# Patient Record
Sex: FEMALE
[De-identification: names, ages, dates, MRNs, and addresses within clinical notes are randomized per-mention and may not be internally consistent; named-entity substitution may affect disease eponyms.]

---

## 2018-12-05 ENCOUNTER — HOSPITAL ENCOUNTER (OUTPATIENT)
Dept: HOSPITAL 5 - GIO | Age: 43
Discharge: HOME | End: 2018-12-05
Attending: INTERNAL MEDICINE
Payer: COMMERCIAL

## 2018-12-05 VITALS — DIASTOLIC BLOOD PRESSURE: 70 MMHG | SYSTOLIC BLOOD PRESSURE: 118 MMHG

## 2018-12-05 DIAGNOSIS — K57.30: ICD-10-CM

## 2018-12-05 DIAGNOSIS — Z85.3: ICD-10-CM

## 2018-12-05 DIAGNOSIS — Z79.899: ICD-10-CM

## 2018-12-05 DIAGNOSIS — Z90.710: ICD-10-CM

## 2018-12-05 DIAGNOSIS — Z90.49: ICD-10-CM

## 2018-12-05 DIAGNOSIS — K63.5: Primary | ICD-10-CM

## 2018-12-05 DIAGNOSIS — K63.89: ICD-10-CM

## 2018-12-05 DIAGNOSIS — K64.8: ICD-10-CM

## 2018-12-05 DIAGNOSIS — Z86.73: ICD-10-CM

## 2018-12-05 DIAGNOSIS — Z98.890: ICD-10-CM

## 2018-12-05 DIAGNOSIS — F17.210: ICD-10-CM

## 2018-12-05 DIAGNOSIS — Z88.0: ICD-10-CM

## 2018-12-05 DIAGNOSIS — Z88.8: ICD-10-CM

## 2018-12-05 DIAGNOSIS — E78.00: ICD-10-CM

## 2018-12-05 DIAGNOSIS — K92.1: ICD-10-CM

## 2018-12-05 PROCEDURE — 45380 COLONOSCOPY AND BIOPSY: CPT

## 2018-12-05 PROCEDURE — 88305 TISSUE EXAM BY PATHOLOGIST: CPT

## 2018-12-05 NOTE — HISTORY AND PHYSICAL REPORT
HISTORY OF PRESENT ILLNESS:  The patient is a 43-year-old white female who has a

prior history of diverticular disease and gastric ulcer, which appears to have

healed, but lately she has been having some lower GI bleeding for which she has

been sent for further evaluation.  She also has been noted to have prior history

of diverticular disease and hemorrhoids, which have flares.  Because she has

some associated abdominal pain, she needs to have a colonoscopy done for any

associated colitis.





ALLERGIES:  She has a history of ALLERGY TO PENICILLIN and NSAIDs.





MEDICATIONS:  She is on multiple medications including oxybutynin, Neurontin,

amitriptyline, Lipitor, Ambien, Medicine for migraine, and Xanax.





SOCIAL HISTORY:  She denies history of smoking, had been a smoker in the past. 

No alcohol use.  No flu shot.





PHYSICAL EXAMINATION:

VITAL SIGNS:  She is afebrile, blood pressure 139/88, pulse is 103, height is 5

feet 1 inch, weight is 180.

NECK:  HEENT exam shows no JVD.

LUNGS:  Clear to auscultation, some reduced breath sounds.

CARDIOVASCULAR:  Examination is normal.

ABDOMEN:  Shows some mid abdominal and left lower quadrant tenderness.  Bowel

sounds are present.

EXTREMITIES:  No pedal edema.

NEUROLOGIC:  Neurologically, the patient is otherwise alert and oriented.





ASSESSMENT:  Lower gastrointestinal bleeding, abdominal pain, left-sided,

possible associated colitis, history of diverticular disease, history of gastric

ulcer, history of migraines and anxiety disorder.





PLAN:  Plan is to do a colonoscopy and possible flex sig with banding to be done

at [facility] on 12/05/2018.





DD: 12/05/2018 11:14

DT: 12/05/2018 12:41

JOB# [id number]  [id number]

SARAHY/BLAKE FERNANDEZ

## 2018-12-05 NOTE — PROCEDURE NOTE
Date of procedure: 12/05/18


Pre-op diagnosis: Hematochezia


Post-op diagnosis: other (Hematochezia secondary to Moderate, Internal 

Hemorrhoid (s/p Banding x 3)/ Moderate,Diverticular disease (most pronounced in 

the proximal Colon)/ Two,Small,Descending Colon Polyps (removed by cold biopsy)/

Normal Ileal and Colon Mucosa)

## 2018-12-05 NOTE — ANESTHESIA CONSULTATION
Anesthesia Consult and Med Hx


Date of service: 12/05/18





- Airway


Anesthetic Teeth Evaluation: Good


ROM Head & Neck: Adequate


Mental/Hyoid Distance: Adequate


Mallampati Class: Class II


Intubation Access Assessment: Probably Good





- Pre-Operative Health Status


ASA Pre-Surgery Classification: ASA2


Proposed Anesthetic Plan: MAC





- Pulmonary


Hx Smoking: Yes (current smoker)





- Central Nervous System


CVA: Yes


Hx Back Pain: Yes


Hx Psychiatric Problems: Yes





- Gastrointestinal


Hx Ulcer: Yes (ulcerative colitis)





- Other Systems


Hx Cancer: Yes

## 2018-12-05 NOTE — OPERATIVE REPORT
PROCEDURE:  Flexible sigmoidoscopy with banding x3.



INDICATIONS:  This is a 43-year-old white female who had a colonoscopy done

because of hematochezia.  Colonoscopy showed presence of moderate internal

hemorrhoid, which may have been the cause of the patient's hematochezia.  In

addition, she was noted to have moderate diverticular disease, most pronounced

in the right colon and 2 small descending colon polyps.  She also was noted to

have normal ileal and colonic mucosa.  No evidence of colitis.  Flexible

sigmoidoscopy was done after getting informed consent using the EGD scope to

which the banding apparatus was mounted.  It was done with MAC anesthesia. 

Instrument was inserted.  After that, a rectal canal injected, had a lidocaine

applied to it, through a syringe.  No needle was used.  The instrument was then

passed through the rectum and retroverted and 3 of the largest hemorrhoids were

suctioned into the suction channel above the dentate line and 1 band was applied

to each of these three large internal hemorrhoids.  The photodocumentation was

obtained.  The scope was withdrawn.  There was no bleeding associated with the

procedure.  No complications associated with the procedure.  After the

procedure, some additional lidocaine was applied to the rectal canal.



ASSESSMENT:  Hematochezia secondary to moderate internal hemorrhoids, status

post banding x 3.  The patient will be asked to take some Sitz baths, avoid

aspirin and aspirin-related products for the next few days.  Also, use Anusol-HC

suppository and some mineral oil if needed and avoid aspirin and aspirin-related

products for the next 4-5 days and follow up in the office in 1-2 weeks' time. 

RNSuze was in the room throughout the entirety of the procedure. 

The patient will also be given some analgesics if needed.





DD: 12/05/2018 11:53

DT: 12/05/2018 13:50

JOB# [id number]  [id number]

SARAHY/BLAKE

## 2021-02-11 NOTE — OPERATIVE REPORT
PROCEDURE:  Colonoscopy with biopsy.



INDICATIONS:  A 43-year-old white female who has been having hematochezia.  She

has a prior history of breast cancer involving the right breast.  Colonoscopy

was done and she also had some associated abdominal pain.  She in addition has a

prior history of hysterectomy.  Colonoscopy was done to make sure that there was

any significant lower GI pathology accounting for the lower GI bleeding.



DESCRIPTION OF PROCEDURE:  Procedure was done after getting informed consent

with MAC anesthesia.  Initial rectal exam was unremarkable.  Instrument was

passed through the rectum onto the cecum, which was identified with ileocecal

valve and the appendiceal orifice.  Visualization was fair.  The terminal ileum

was intubated showed normal mucosa.  Cecum was identified with ileocecal valve

and appendiceal orifice.  There was moderate and some deep diverticular disease

noted in the proximal colon and few scattered in the transverse and the left

colon.  In the descending colon, there were two small polyps noted, which may

have been hyperplastic probably about 7 mm in diameter that were removed by cold

biopsy.  There was minimal bleeding from the biopsy site.  The remaining part of

the left colon showed a few scattered diverticula and no other pathology and the

rectum showed moderate internal hemorrhoid, which may have been the cause of the

patient's hematochezia.



ASSESSMENT:  Hematochezia secondary to moderate internal hemorrhoids.  Two small

descending colon polyps, possibly hyperplastic, removed by cold biopsy,

associated with minor bleeding.  Moderate diverticular disease scattered

throughout the colon, most pronounced in the proximal colon where some were very

deep and normal ileal mucosa and normal colon mucosa.



PLAN:  To encourage the patient to take fiber supplements and also have the

patient to avoid aspirin and aspirin-related products for the next few days. 

Follow up in the office in 1-2 weeks' time.  Since the patient's hematochezia

was secondary to moderate internal hemorrhoid, flex sig with banding will also

be done for treatment of that condition.  RN, Suze Hicks was in the room

throughout the entirety of the procedure.





DD: 12/05/2018 11:41

DT: 12/05/2018 13:55

JOB# [id number]  [id number]

SARAHY/BLAKE "years"